# Patient Record
Sex: FEMALE | NOT HISPANIC OR LATINO | URBAN - METROPOLITAN AREA
[De-identification: names, ages, dates, MRNs, and addresses within clinical notes are randomized per-mention and may not be internally consistent; named-entity substitution may affect disease eponyms.]

---

## 2024-09-06 NOTE — PROGRESS NOTES
"  Patient ID: [id number]     Chief Complaint: Establish Care        HPI:     Mae Luevano is a 19 y.o. female here today   Patient here to establish with PCP/Wellness visit- referred to clinic by   1) Sore throat:  Patient had appointment for wellness visit-however 3 days ago she started experiencing a sore throat-more noticeable on the left side-painful to swallow-not associated with systemic symptoms such as fever/chills/nausea/vomiting/URI symptoms.    Past Medical History:   Diagnosis Date    Angioedema         History reviewed. No pertinent surgical history.     Social History     Tobacco Use    Smoking status: Never    Smokeless tobacco: Never        Social History     Socioeconomic History    Marital status: Significant Other     Spouse name: Flaco-2022    Number of children: 0        Current Outpatient Medications   Medication Instructions    AUROVELA 24 FE 1 mg-20 mcg (24)/75 mg (4) per tablet 1 tablet, Oral, Daily    triamcinolone (NASACORT) 55 mcg nasal inhaler 2 sprays, Nasal, Daily       Review of patient's allergies indicates:  No Known Allergies     Patient Care Team:  Leatha Guerra MD as PCP - General (Family Medicine)     Subjective:     Review of Systems    12 point review of systems conducted, negative except as stated in the history of present illness. See HPI for details.      Objective:     Visit Vitals  /79 (BP Location: Left arm, Patient Position: Sitting, BP Method: Large (Automatic))   Pulse 68   Resp 16   Ht 5' 2" (1.575 m)   Wt 67.3 kg (148 lb 6.4 oz)   LMP 08/26/2024   SpO2 98%   BMI 27.14 kg/m²       Physical Exam    General: Alert and oriented, No acute distress.  Head: Normocephalic, Atraumatic.  Eye: Pupils are equal, round and reactive to light, Extraocular movements are intact, Sclera non-icteric.  Ears/Nose/Throat: Normal- middle ear fluid, Mucosa moist,Clear.  Neck/Thyroid: Supple, Erythema-blister noted,  No palpable thyromegaly or thyroid nodule, No lymphadenopathy, " No JVD, Full range of motion.  Respiratory: Clear to auscultation bilaterally; No wheezes, rales or rhonchi,Non-labored respirations, Symmetrical chest wall expansion.  Cardiovascular: Regular rate and rhythm, S1/S2 normal, No murmurs, rubs or gallops.  Gastrointestinal: Soft, Non-tender, Non-distended, Normal bowel sounds, No palpable organomegaly.  Musculoskeletal: Normal range of motion.  Integumentary: Warm, Dry, Intact, No suspicious lesions or rashes.  Extremities: No clubbing, cyanosis or edema  Neurologic: No focal deficits, Cranial Nerves II-XII are grossly intact, Motor strength normal upper and lower extremities, Sensory exam intact.  Psychiatric: Normal interaction, Coherent speech, Euthymic mood, Appropriate affect       Assessment:       ICD-10-CM ICD-9-CM   1. Wellness examination  Z00.00 V70.0   2. Sore throat  J02.9 462        Plan:       Cervical Cancer Screening - Pap guidelines discussed    Breast Cancer Screening - Mammogram to start at age 40    Colon Cancer Screening - Colon cancer screening to start at age 45     Eye Exam - Recommend annually.    Dental Exam - Recommend biannual exams.     Vaccinations -   Immunization History   Administered Date(s) Administered    DTaP 2004, 2004, 2004, 11/17/2005, 10/27/2009    HIB 2004, 2004, 02/18/2005    HPV 9-Valent 04/23/2022    HPV Quadrivalent 06/30/2015    Hepatitis A, Pediatric/Adolescent, 2 Dose 04/18/2006, 06/23/2007    Hepatitis B, Pediatric/Adolescent 2004, 2004, 2004, 02/18/2005    IPV 2004, 2004, 2004, 10/27/2009    Influenza - Trivalent (PED) 10/27/2009    Influenza A (H1N1) 2009 Monovalent - Intranasal 12/05/2009    MMR 02/18/2005, 10/27/2009    Meningococcal B, recombinant 04/23/2022    Meningococcal Conjugate (MCV4P) 06/30/2015, 11/02/2020    Pneumococcal Conjugate - 7 Valent 2004, 2004, 2004, 2004    Tdap 06/30/2015    Varicella 11/17/2005,  10/27/2009    Immunization guidelines reviewed with the patient.  Encourage patient to prevent disease through immunizations.    1. Wellness examination  Wellness: Discussed Immunization/Preventative Healthcare including yearly eye exam/Nutrition/Mental health/Emotional health /Physical activity/Accident prevention-suicide prevention/Body image/Safe Sex/Healthy relationship-importance of delaying relationship/Acquiring adequate tools for Work.    - CBC Auto Differential; Future  - Comprehensive Metabolic Panel; Future  - Lipid Panel; Future  - TSH; Future  - Hemoglobin A1C; Future  - HIV 1/2 Ag/Ab (4th Gen); Future  - Hepatitis C Antibody; Future  - Chlamydia/GC, PCR; Future    2. Sore throat  Pathophysiology/Treatment/ Dangers Discussed.  Check strep screen.  Maximize treatment of seasonal allergies.  Patient to call office with acute changes or concerns.    - Throat Screen, Rapid Strep  - triamcinolone (NASACORT) 55 mcg nasal inhaler; 2 sprays by Nasal route once daily.  Dispense: 16.9 mL; Refill: 3    The patient's Health Maintenance was reviewed and the following appears to be due at this time:   Health Maintenance Due   Topic Date Due    Hepatitis C Screening  Never done    Lipid Panel  Never done    HIV Screening  Never done    Influenza Vaccine (1) 09/01/2024    COVID-19 Vaccine (1 - 2023-24 season) Never done         Follow up in about 2 weeks (around 9/24/2024) for Labs. In addition to their scheduled follow up, the patient has also been instructed to follow up on as needed basis.     Future Appointments   Date Time Provider Department Center   9/24/2024 10:30 AM Leatha Guerra MD [facility]        Leatha Guerra MD

## 2024-09-10 ENCOUNTER — OFFICE VISIT (OUTPATIENT)
Dept: FAMILY MEDICINE | Facility: CLINIC | Age: 19
End: 2024-09-10
Payer: COMMERCIAL

## 2024-09-10 VITALS
WEIGHT: 148.38 LBS | BODY MASS INDEX: 27.3 KG/M2 | HEART RATE: 68 BPM | OXYGEN SATURATION: 98 % | RESPIRATION RATE: 16 BRPM | DIASTOLIC BLOOD PRESSURE: 79 MMHG | HEIGHT: 62 IN | SYSTOLIC BLOOD PRESSURE: 124 MMHG

## 2024-09-10 DIAGNOSIS — J02.9 SORE THROAT: ICD-10-CM

## 2024-09-10 DIAGNOSIS — Z00.00 WELLNESS EXAMINATION: Primary | ICD-10-CM

## 2024-09-10 PROCEDURE — 99385 PREV VISIT NEW AGE 18-39: CPT | Mod: 1E,GY,, | Performed by: FAMILY MEDICINE

## 2024-09-10 PROCEDURE — 1159F MED LIST DOCD IN RCRD: CPT | Mod: CPTII,,, | Performed by: FAMILY MEDICINE

## 2024-09-10 PROCEDURE — 99203 OFFICE O/P NEW LOW 30 MIN: CPT | Mod: 25,,, | Performed by: FAMILY MEDICINE

## 2024-09-10 PROCEDURE — 1160F RVW MEDS BY RX/DR IN RCRD: CPT | Mod: CPTII,,, | Performed by: FAMILY MEDICINE

## 2024-09-10 PROCEDURE — 3078F DIAST BP <80 MM HG: CPT | Mod: CPTII,,, | Performed by: FAMILY MEDICINE

## 2024-09-10 PROCEDURE — 3008F BODY MASS INDEX DOCD: CPT | Mod: CPTII,,, | Performed by: FAMILY MEDICINE

## 2024-09-10 PROCEDURE — 3074F SYST BP LT 130 MM HG: CPT | Mod: CPTII,,, | Performed by: FAMILY MEDICINE

## 2024-09-10 RX ORDER — NORETHINDRONE ACETATE AND ETHINYL ESTRADIOL AND FERROUS FUMARATE 1MG-20(24)
1 KIT ORAL DAILY
COMMUNITY
Start: 2024-08-26

## 2024-09-10 RX ORDER — TRIAMCINOLONE ACETONIDE 55 UG/1
2 SPRAY, METERED NASAL DAILY
Qty: 16.9 ML | Refills: 3 | Status: SHIPPED | OUTPATIENT
Start: 2024-09-10 | End: 2024-10-10

## 2024-09-13 LAB
CTP QC/QA: YES
S PYO RRNA THROAT QL PROBE: NEGATIVE

## 2024-10-05 LAB
ALBUMIN: 4.6
ALP SERPL-CCNC: 65 U/L
ALT SERPL-CCNC: 16 U/L
AST SERPL-CCNC: 20 U/L
BILIRUBIN TOTAL: 0.4
BUN BLD-MCNC: 7 MG/DL (ref 4–21)
CALCIUM SERPL-MCNC: 9.5 MG/DL (ref 8.7–10.7)
CHLORIDE SERPL-SCNC: 104 MMOL/L (ref 99–108)
CHOLEST SERPL-MSCNC: 178 MG/DL (ref 0–200)
CO2 SERPL-SCNC: 22 MMOL/L
CREAT SERPL-MCNC: 0.7 MG/DL
EGFR: 121
GLUCOSE SERPL-MCNC: 83 MG/DL
HBA1C MFR BLD: 5.4 % (ref 4–6)
HCV AB SERPL QL IA: NEGATIVE
HDLC SERPL-MCNC: 49 MG/DL (ref 35–70)
HIV 1+2 AB+HIV1 P24 AG SERPL QL IA: NEGATIVE
LDL/HDL RATIO: 2.3
LDLC SERPL CALC-MCNC: 112 MG/DL (ref 0–160)
POTASSIUM SERPL-SCNC: 4.3 MMOL/L (ref 3.4–5.3)
SODIUM BLD-SCNC: 139 MMOL/L (ref 137–147)
TRIGL SERPL-MCNC: 79 MG/DL (ref 40–160)
TSH, THIRD (3RD) GENERATION: 1.4

## 2024-10-07 ENCOUNTER — DOCUMENTATION ONLY (OUTPATIENT)
Dept: FAMILY MEDICINE | Facility: CLINIC | Age: 19
End: 2024-10-07

## 2024-10-07 ENCOUNTER — OFFICE VISIT (OUTPATIENT)
Dept: FAMILY MEDICINE | Facility: CLINIC | Age: 19
End: 2024-10-07
Payer: COMMERCIAL

## 2024-10-07 DIAGNOSIS — D84.1 HEREDITARY ANGIOEDEMA: ICD-10-CM

## 2024-10-07 DIAGNOSIS — Z00.00 WELLNESS EXAMINATION: ICD-10-CM

## 2024-10-07 DIAGNOSIS — Z71.2 ENCOUNTER TO DISCUSS TEST RESULTS: Primary | ICD-10-CM

## 2024-10-07 PROCEDURE — 3044F HG A1C LEVEL LT 7.0%: CPT | Mod: CPTII,,, | Performed by: FAMILY MEDICINE

## 2024-10-07 PROCEDURE — 1159F MED LIST DOCD IN RCRD: CPT | Mod: CPTII,,, | Performed by: FAMILY MEDICINE

## 2024-10-07 PROCEDURE — G2211 COMPLEX E/M VISIT ADD ON: HCPCS | Mod: ,,, | Performed by: FAMILY MEDICINE

## 2024-10-07 PROCEDURE — 99213 OFFICE O/P EST LOW 20 MIN: CPT | Mod: ,,, | Performed by: FAMILY MEDICINE

## 2024-10-07 PROCEDURE — 1160F RVW MEDS BY RX/DR IN RCRD: CPT | Mod: CPTII,,, | Performed by: FAMILY MEDICINE

## 2024-10-07 NOTE — PROGRESS NOTES
Patient ID: [id number]     Chief Complaint: Test Results    HPI:     Mae Luevano is a 19 y.o. female here today for follow-up:   Patient has been doing well last visit.  No acute complaints.      Past Medical History:   Diagnosis Date    Hereditary angioedema         History reviewed. No pertinent surgical history.     Social History     Tobacco Use    Smoking status: Never    Smokeless tobacco: Never        Social History     Socioeconomic History    Marital status: Significant Other     Spouse name: Flaco-2022    Number of children: 0        Current Outpatient Medications   Medication Instructions    AUROVELA 24 FE 1 mg-20 mcg (24)/75 mg (4) per tablet 1 tablet, Daily    triamcinolone (NASACORT) 55 mcg nasal inhaler 2 sprays, Nasal, Daily       Review of patient's allergies indicates:  No Known Allergies     Patient Care Team:  Leatha Guerra MD as PCP - General (Family Medicine)       Subjective:     Review of Systems    12 point review of systems conducted, negative except as stated in the history of present illness. See HPI for details.      Objective:     Visit Vitals  LMP 08/26/2024       Physical Exam    General: Alert and oriented, No acute distress.  Head: Normocephalic, Atraumatic.  Eye: Pupils are equal, round and reactive to light, Extraocular movements are intact, Sclera non-icteric.  Ears/Nose/Throat: Normal, Mucosa moist,Clear.  Neck/Thyroid: Supple, Non-tender  Respiratory: Clear to auscultation bilaterally  Cardiovascular: Regular rate and rhythm, S1/S2 normal  Gastrointestinal: Soft, Non-tender, Non-distended, Normal bowel sounds, No palpable organomegaly.  Musculoskeletal: Normal range of motion.  Integumentary: Warm, Dry  Extremities: No clubbing, cyanosis or edema  Neurologic: No focal deficits, Cranial Nerves II-XII are grossly intact  Psychiatric: Normal interaction, Coherent speech       Assessment:       ICD-10-CM ICD-9-CM   1. Encounter to discuss test results  Z71.2 V65.49   2.  Hereditary angioedema  D84.1 277.6   3. Wellness examination  Z00.00 V70.0        Plan:     1. Encounter to discuss test results (Primary)  CMP/CBC/HGBA1C/TSH/FLP/UA/ Hepatitis-C/HIV. Discussed.     2. Hereditary angioedema  Patient is currently stable.  No acute modifications recommended.  Continue to monitor.    3. Wellness examination  Patient given lab orders to be completed before wellness visit.   - CBC Auto Differential; Future  - Comprehensive Metabolic Panel; Future  - Lipid Panel; Future  - TSH; Future  - Hemoglobin A1C; Future  - Urinalysis, Reflex to Urine Culture; Future         Follow up in about 1 year (around 10/7/2025) for Annual Wellness. In addition to their scheduled follow up, the patient has also been instructed to follow up on as needed basis.     Future Appointments   Date Time Provider Department Center   10/9/2025  1:00 PM Leatha Guerra MD [facility] [place] Indira Gautam, MD

## 2024-11-06 ENCOUNTER — OFFICE VISIT (OUTPATIENT)
Dept: FAMILY MEDICINE | Facility: CLINIC | Age: 19
End: 2024-11-06
Payer: COMMERCIAL

## 2024-11-06 VITALS
OXYGEN SATURATION: 98 % | WEIGHT: 150 LBS | TEMPERATURE: 98 F | HEART RATE: 75 BPM | BODY MASS INDEX: 27.6 KG/M2 | DIASTOLIC BLOOD PRESSURE: 64 MMHG | SYSTOLIC BLOOD PRESSURE: 98 MMHG | HEIGHT: 62 IN

## 2024-11-06 DIAGNOSIS — R21 RASH: Primary | ICD-10-CM

## 2024-11-06 PROCEDURE — 3078F DIAST BP <80 MM HG: CPT | Mod: CPTII,,, | Performed by: FAMILY MEDICINE

## 2024-11-06 PROCEDURE — 99213 OFFICE O/P EST LOW 20 MIN: CPT | Mod: ,,, | Performed by: FAMILY MEDICINE

## 2024-11-06 PROCEDURE — 3044F HG A1C LEVEL LT 7.0%: CPT | Mod: CPTII,,, | Performed by: FAMILY MEDICINE

## 2024-11-06 PROCEDURE — 1160F RVW MEDS BY RX/DR IN RCRD: CPT | Mod: CPTII,,, | Performed by: FAMILY MEDICINE

## 2024-11-06 PROCEDURE — 1159F MED LIST DOCD IN RCRD: CPT | Mod: CPTII,,, | Performed by: FAMILY MEDICINE

## 2024-11-06 PROCEDURE — 3008F BODY MASS INDEX DOCD: CPT | Mod: CPTII,,, | Performed by: FAMILY MEDICINE

## 2024-11-06 PROCEDURE — 3074F SYST BP LT 130 MM HG: CPT | Mod: CPTII,,, | Performed by: FAMILY MEDICINE

## 2024-11-06 RX ORDER — HALOBETASOL PROPIONATE 0.5 MG/G
CREAM TOPICAL 2 TIMES DAILY
Qty: 15 G | Refills: 1 | Status: SHIPPED | OUTPATIENT
Start: 2024-11-06 | End: 2024-11-21

## 2024-11-06 RX ORDER — CETIRIZINE HYDROCHLORIDE 10 MG/1
10 TABLET ORAL DAILY
Qty: 30 TABLET | Refills: 1 | Status: SHIPPED | OUTPATIENT
Start: 2024-11-06 | End: 2025-11-06

## 2024-11-06 NOTE — PROGRESS NOTES
"   Patient ID: [id number]     Chief Complaint:  Rash    HPI:     Mae Luevano is a 19 y.o. female here today for acute visit :   1) Has had a rash on her body for the last 2 days-unable to correlate rash with any modifications in diet, activity, or exposures-rash in the back of her ears-is causing itching-afterwards noticed areas of rash on her chest and abdomen-areas are itching.  Patient has not taken any medication.  Not associated with systemic symptoms such as fever, chills, nausea, vomiting or shortness of breath.    Past Medical History:   Diagnosis Date    Hereditary angioedema         History reviewed. No pertinent surgical history.     Social History     Tobacco Use    Smoking status: Never    Smokeless tobacco: Never        Social History     Socioeconomic History    Marital status: Significant Other     Spouse name: Flaco-2022    Number of children: 0        Current Outpatient Medications   Medication Instructions    AUROVELA 24 FE 1 mg-20 mcg (24)/75 mg (4) per tablet 1 tablet, Daily    cetirizine (ZYRTEC) 10 mg, Oral, Daily    halobetasol (ULTRAVATE) 0.05 % cream Topical (Top), 2 times daily       Review of patient's allergies indicates:  No Known Allergies     Patient Care Team:  Leatha Guerra MD as PCP - General (Family Medicine)       Subjective:     Review of Systems    12 point review of systems conducted, negative except as stated in the history of present illness. See HPI for details.      Objective:     Visit Vitals  BP 98/64   Pulse 75   Temp 97.8 °F (36.6 °C) (Temporal)   Ht 5' 2" (1.575 m)   Wt 68 kg (150 lb)   SpO2 98%   BMI 27.44 kg/m²       Physical Exam    General: Alert and oriented, No acute distress.  Head: Normocephalic, Atraumatic.  Eye: Pupils are equal, round and reactive to light, Extraocular movements are intact, Sclera non-icteric.  Ears/Nose/Throat: Normal, Mucosa moist,Clear.  Neck/Thyroid: Supple, Non-tender  Respiratory: Clear to auscultation " bilaterally  Cardiovascular: Regular rate and rhythm, S1/S2 normal  Gastrointestinal: Soft, Non-tender, Non-distended, Normal bowel sounds, No palpable organomegaly.  Musculoskeletal: Normal range of motion.  Integumentary: Warm, Dry-patch of redness with scaliness behind the ears-small papules on her chest  Extremities: No clubbing, cyanosis or edema  Neurologic: No focal deficits, Cranial Nerves II-XII are grossly intact  Psychiatric: Normal interaction, Coherent speech       Assessment:       ICD-10-CM ICD-9-CM   1. Rash  R21 782.1        Plan:     1. Rash (Primary)  Pathophysiology/Treatment/ Dangers Discussed.  Symptomatic treatment with antihistamine/steroid cream-if worsening or no improvement patient to call office-follow-up with dermatologist if needed.   - halobetasol (ULTRAVATE) 0.05 % cream; Apply topically 2 (two) times daily. for 15 days  Dispense: 15 g; Refill: 1  - cetirizine (ZYRTEC) 10 MG tablet; Take 1 tablet (10 mg total) by mouth once daily.  Dispense: 30 tablet; Refill: 1  - Ambulatory referral/consult to Dermatology; Future         No follow-ups on file. In addition to their scheduled follow up, the patient has also been instructed to follow up on as needed basis.     Future Appointments   Date Time Provider Department Center   10/9/2025  1:00 PM Leatha Guerra MD [facility] GLADIS Guerra MD

## 2025-03-24 ENCOUNTER — DOCUMENTATION ONLY (OUTPATIENT)
Facility: CLINIC | Age: 20
End: 2025-03-24
Payer: COMMERCIAL

## 2025-06-06 ENCOUNTER — DOCUMENTATION ONLY (OUTPATIENT)
Facility: CLINIC | Age: 20
End: 2025-06-06
Payer: COMMERCIAL

## 2025-06-26 ENCOUNTER — OFFICE VISIT (OUTPATIENT)
Dept: FAMILY MEDICINE | Facility: CLINIC | Age: 20
End: 2025-06-26
Payer: COMMERCIAL

## 2025-06-26 VITALS
BODY MASS INDEX: 27.6 KG/M2 | DIASTOLIC BLOOD PRESSURE: 70 MMHG | OXYGEN SATURATION: 100 % | WEIGHT: 150 LBS | SYSTOLIC BLOOD PRESSURE: 112 MMHG | HEART RATE: 60 BPM | HEIGHT: 62 IN | RESPIRATION RATE: 16 BRPM

## 2025-06-26 DIAGNOSIS — L30.9 DERMATITIS: Primary | ICD-10-CM

## 2025-06-26 PROCEDURE — 1159F MED LIST DOCD IN RCRD: CPT | Mod: CPTII,,, | Performed by: FAMILY MEDICINE

## 2025-06-26 PROCEDURE — 1160F RVW MEDS BY RX/DR IN RCRD: CPT | Mod: CPTII,,, | Performed by: FAMILY MEDICINE

## 2025-06-26 PROCEDURE — 3008F BODY MASS INDEX DOCD: CPT | Mod: CPTII,,, | Performed by: FAMILY MEDICINE

## 2025-06-26 PROCEDURE — 3078F DIAST BP <80 MM HG: CPT | Mod: CPTII,,, | Performed by: FAMILY MEDICINE

## 2025-06-26 PROCEDURE — 99213 OFFICE O/P EST LOW 20 MIN: CPT | Mod: ,,, | Performed by: FAMILY MEDICINE

## 2025-06-26 PROCEDURE — 3074F SYST BP LT 130 MM HG: CPT | Mod: CPTII,,, | Performed by: FAMILY MEDICINE

## 2025-06-26 RX ORDER — HALOBETASOL PROPIONATE 0.5 MG/G
CREAM TOPICAL 2 TIMES DAILY
Qty: 15 G | Refills: 1 | Status: SHIPPED | OUTPATIENT
Start: 2025-06-26 | End: 2025-07-11

## 2025-06-26 RX ORDER — HALOBETASOL PROPIONATE 0.5 MG/G
CREAM TOPICAL 2 TIMES DAILY
Qty: 15 G | Refills: 1 | Status: SHIPPED | OUTPATIENT
Start: 2025-06-26 | End: 2025-06-26 | Stop reason: SDUPTHER

## 2025-06-26 NOTE — PROGRESS NOTES
"   Patient ID: [id number]     Chief Complaint: Rash      HPI:     Mae Luevano is a 20 y.o. female here today for  follow up:   Patient has a rash that is similar to the rash she had last year-areas of redness with itching-did use cream that helped with the itching-was evaluated by Dermatologist-no acute finding-told most likely related to use of laundry detergent.  Rash resolved after a few weeks.  Patient has not now moved back with her parents-upon further questioning recognize that she is using the the same detergent she was using last year-when the rash occurred.  Concerned about possible allergies.      Past Medical History:   Diagnosis Date    Hereditary angioedema         History reviewed. No pertinent surgical history.     Social History[1]     Social History[2]     Current Outpatient Medications   Medication Instructions    AUROVELA 24 FE 1 mg-20 mcg (24)/75 mg (4) per tablet 1 tablet, Daily    cetirizine (ZYRTEC) 10 mg, Oral, Daily    halobetasol (ULTRAVATE) 0.05 % cream Topical (Top), 2 times daily       Review of patient's allergies indicates:  No Known Allergies     Patient Care Team:  Leatha Guerra MD as PCP - General (Family Medicine)       Subjective:     Review of Systems    12 point review of systems conducted, negative except as stated in the history of present illness. See HPI for details.      Objective:     Visit Vitals  /70 (BP Location: Left arm, Patient Position: Sitting)   Pulse 60   Resp 16   Ht 5' 2" (1.575 m)   Wt 68 kg (150 lb)   LMP 06/06/2025 (Exact Date)   SpO2 100%   BMI 27.44 kg/m²       Physical Exam    General: Alert and oriented, No acute distress.  Head: Normocephalic, Atraumatic.  Eye: Pupils are equal, round and reactive to light, Extraocular movements are intact, Sclera non-icteric.  Ears/Nose/Throat: Normal, Mucosa moist,Clear.  Neck/Thyroid: Supple, Non-tender  Respiratory: Clear to auscultation bilaterally  Cardiovascular: Regular rate and rhythm, S1/S2 " normal  Gastrointestinal: Soft, Non-tender, Non-distended, Normal bowel sounds, No palpable organomegaly.  Musculoskeletal: Normal range of motion.  Integumentary: Warm, Dry-states rash is better today-showed picture-that showed red patches  Extremities: No clubbing, cyanosis or edema  Neurologic: No focal deficits, Cranial Nerves II-XII are grossly intact  Psychiatric: Normal interaction, Coherent speech       Assessment:       ICD-10-CM ICD-9-CM   1. Dermatitis  L30.9 692.9        Plan:     1. Dermatitis (Primary)  Pathophysiology/Treatment/ Dangers Discussed.  Treatment options discussed-Rx steroid cream for symptomatic treatment-in case patient wishes to obtain further evaluation regarding causes or allergens-she is being referred to allergist.  - Ambulatory referral/consult to Allergy; Future  - halobetasol (ULTRAVATE) 0.05 % cream; Apply topically 2 (two) times daily. for 15 days  Dispense: 15 g; Refill: 1      Follow up if symptoms worsen or fail to improve, for Annual Wellness. In addition to their scheduled follow up, the patient has also been instructed to follow up on as needed basis.     Future Appointments   Date Time Provider Department Center   10/9/2025  1:00 PM Leatha Guerra MD [facility]        Leatha Guerra MD         [1]   Social History  Tobacco Use    Smoking status: Never    Smokeless tobacco: Never   [2]   Social History  Socioeconomic History    Marital status: Single    Number of children: 0